# Patient Record
Sex: FEMALE
[De-identification: names, ages, dates, MRNs, and addresses within clinical notes are randomized per-mention and may not be internally consistent; named-entity substitution may affect disease eponyms.]

---

## 2017-06-03 ENCOUNTER — HEALTH MAINTENANCE LETTER (OUTPATIENT)
Age: 52
End: 2017-06-03

## 2022-04-04 ENCOUNTER — TELEPHONE (OUTPATIENT)
Dept: FAMILY MEDICINE | Facility: CLINIC | Age: 57
End: 2022-04-04

## 2022-04-04 NOTE — TELEPHONE ENCOUNTER
Reason for call:  Other   Patient called regarding (reason for call): Patient is calling in, She was reviewing [facility] of her immunizations, She said it was noted to contact [facility]   Additional comments: She called [facility] and they transferred her to the [facility] line.    She needs to discuss this as she has not gotten this immunization at [facility] and was told if she needs this removed to contact the clinic    Phone number to reach patient:  [phone] ok to leave a message    Best Time:      Can we leave a detailed message on this number?  YES    Travel screening: Not Applicable